# Patient Record
Sex: MALE | Race: WHITE | NOT HISPANIC OR LATINO | URBAN - METROPOLITAN AREA
[De-identification: names, ages, dates, MRNs, and addresses within clinical notes are randomized per-mention and may not be internally consistent; named-entity substitution may affect disease eponyms.]

---

## 2019-04-30 ENCOUNTER — HOSPITAL ENCOUNTER (EMERGENCY)
Facility: HOSPITAL | Age: 29
Discharge: HOME | End: 2019-04-30
Attending: EMERGENCY MEDICINE
Payer: MEDICAID

## 2019-04-30 VITALS
TEMPERATURE: 99 F | WEIGHT: 159.38 LBS | HEIGHT: 70 IN | BODY MASS INDEX: 22.82 KG/M2 | RESPIRATION RATE: 20 BRPM | DIASTOLIC BLOOD PRESSURE: 66 MMHG | HEART RATE: 80 BPM | SYSTOLIC BLOOD PRESSURE: 123 MMHG | OXYGEN SATURATION: 98 %

## 2019-04-30 DIAGNOSIS — S61.412A LACERATION OF LEFT HAND WITHOUT FOREIGN BODY, INITIAL ENCOUNTER: Primary | ICD-10-CM

## 2019-04-30 PROCEDURE — 12001 RPR S/N/AX/GEN/TRNK 2.5CM/<: CPT

## 2019-04-30 PROCEDURE — 25000003 PHARM REV CODE 250: Performed by: REGISTERED NURSE

## 2019-04-30 PROCEDURE — 90471 IMMUNIZATION ADMIN: CPT | Performed by: REGISTERED NURSE

## 2019-04-30 PROCEDURE — 90715 TDAP VACCINE 7 YRS/> IM: CPT | Performed by: REGISTERED NURSE

## 2019-04-30 PROCEDURE — 63600175 PHARM REV CODE 636 W HCPCS: Performed by: REGISTERED NURSE

## 2019-04-30 PROCEDURE — 99284 EMERGENCY DEPT VISIT MOD MDM: CPT | Mod: 25

## 2019-04-30 RX ORDER — LIDOCAINE HYDROCHLORIDE 10 MG/ML
10 INJECTION, SOLUTION EPIDURAL; INFILTRATION; INTRACAUDAL; PERINEURAL
Status: COMPLETED | OUTPATIENT
Start: 2019-04-30 | End: 2019-04-30

## 2019-04-30 RX ORDER — IBUPROFEN 800 MG/1
800 TABLET ORAL
Status: COMPLETED | OUTPATIENT
Start: 2019-04-30 | End: 2019-04-30

## 2019-04-30 RX ADMIN — LIDOCAINE HYDROCHLORIDE 100 MG: 10 INJECTION, SOLUTION EPIDURAL; INFILTRATION; INTRACAUDAL; PERINEURAL at 08:04

## 2019-04-30 RX ADMIN — NEOMYCIN AND POLYMYXIN B SULFATES AND BACITRACIN ZINC: 400; 3.5; 5 OINTMENT TOPICAL at 08:04

## 2019-04-30 RX ADMIN — CLOSTRIDIUM TETANI TOXOID ANTIGEN (FORMALDEHYDE INACTIVATED), CORYNEBACTERIUM DIPHTHERIAE TOXOID ANTIGEN (FORMALDEHYDE INACTIVATED), BORDETELLA PERTUSSIS TOXOID ANTIGEN (GLUTARALDEHYDE INACTIVATED), BORDETELLA PERTUSSIS FILAMENTOUS HEMAGGLUTININ ANTIGEN (FORMALDEHYDE INACTIVATED), BORDETELLA PERTUSSIS PERTACTIN ANTIGEN, AND BORDETELLA PERTUSSIS FIMBRIAE 2/3 ANTIGEN 0.5 ML: 5; 2; 2.5; 5; 3; 5 INJECTION, SUSPENSION INTRAMUSCULAR at 09:04

## 2019-04-30 RX ADMIN — IBUPROFEN 800 MG: 800 TABLET, FILM COATED ORAL at 09:04

## 2019-05-01 NOTE — ED PROVIDER NOTES
SCRIBE #1 NOTE: I, Yessenia Robin, am scribing for, and in the presence of, Yunier Bedolla Jr., TRA. I have scribed the entire note.      History      Chief Complaint   Patient presents with    Laceration     patient reports cut left palm with        Review of patient's allergies indicates:  No Known Allergies     HPI   HPI    4/30/2019, 8:35 PM   History obtained from the patient      History of Present Illness: Rene Barrientos is a 28 y.o. male patient who presents to the Emergency Department for a laceration to L palm which onset just PTA with a . Symptoms are constant and moderate in severity. No mitigating or exacerbating factors reported. No other sxs reported. Patient denies any fever, chills, extremity weakness/numbness, HA, dizziness, n/v, CP, SOB, and all other sxs at this time. No further complaints or concerns at this time.         Arrival mode: Personal vehicle    PCP: No primary care provider on file.   Past Medical History:  Past medical history reviewed not relevant      Past Surgical History:  Past surgical history reviewed not relevant      Family History:  Family history reviewed not relevant      Social History:  Social History    Social History Main Topics    Social History Main Topics    Smoking status: Unknown if ever smoked    Smokeless tobacco: Unknown if ever used    Alcohol Use: Unknown drinking history    Drug Use: Unknown if ever used    Sexual Activity: Unknown         ROS   Review of Systems   Constitutional: Negative for chills and fever.   HENT: Negative for sore throat.    Respiratory: Negative for shortness of breath.    Cardiovascular: Negative for chest pain.   Gastrointestinal: Negative for abdominal pain, nausea and vomiting.   Genitourinary: Negative for dysuria.   Musculoskeletal: Negative for back pain.   Skin: Positive for wound (laceration to L palm). Negative for rash.   Neurological: Negative for dizziness, weakness, numbness and headaches.    Hematological: Does not bruise/bleed easily.   All other systems reviewed and are negative.      Physical Exam      Initial Vitals [19]   BP Pulse Resp Temp SpO2   123/66 80 20 98.9 °F (37.2 °C) 98 %      MAP       --          Physical Exam  Nursing Notes and Vital Signs Reviewed.  Constitutional: Patient is in no acute distress. Well-developed and well-nourished.  Head: Atraumatic. Normocephalic.  Eyes: PERRL. EOM intact. Conjunctivae are not pale. No scleral icterus.  ENT: Mucous membranes are moist. Oropharynx is clear and symmetric.    Neck: Supple. Full ROM. No lymphadenopathy.  Cardiovascular: Regular rate. Regular rhythm. No murmurs, rubs, or gallops. Distal pulses are 2+ and symmetric.  Pulmonary/Chest: No respiratory distress. Clear to auscultation bilaterally. No wheezing or rales.  Abdominal: Soft and non-distended.  There is no tenderness.  No rebound, guarding, or rigidity. Good bowel sounds.  Genitourinary: No CVA tenderness  Musculoskeletal: Moves all extremities. No obvious deformities. No edema. No calf tenderness.  Left Hand: No obvious deformity. 2 cm full thickness laceration to the L medial palmar aspect of L hand. Full flexion and extension of the wrist. Radial, median, and ulnar nerves are intact. Radial and ulnar pulses are 2+. Normal capillary refill.  Distal sensation is intact.  Skin: Warm and dry.  Neurological:  Alert, awake, and appropriate.  Normal speech.  No acute focal neurological deficits are appreciated.  Psychiatric: Normal affect. Good eye contact. Appropriate in content.    ED Course    Lac Repair  Date/Time: 2019 8:54 PM  Performed by: MARANDA Soto Jr.  Authorized by: Vinayak Bolton Jr., MD   Consent Done: Yes  Consent: Verbal consent obtained.  Consent given by: patient  Patient understanding: patient states understanding of the procedure being performed  Patient identity confirmed:  and name  Body area: upper extremity  Location details:  "left hand  Laceration length: 2 cm  Foreign bodies: no foreign bodies  Tendon involvement: none  Nerve involvement: none  Vascular damage: no  Anesthesia: local infiltration    Anesthesia:  Local Anesthetic: lidocaine 1% without epinephrine  Anesthetic total: 4 mL  Patient sedated: no  Preparation: Patient was prepped and draped in the usual sterile fashion.  Irrigation solution: saline  Irrigation method: syringe  Amount of cleaning: extensive  Debridement: none  Degree of undermining: none  Skin closure: Ethilon (4-0)  Number of sutures: 4  Technique: simple  Approximation: close  Approximation difficulty: simple  Dressing: dressing applied and antibiotic ointment  Patient tolerance: Patient tolerated the procedure well with no immediate complications        ED Vital Signs:  Vitals:    04/30/19 2005   BP: 123/66   Pulse: 80   Resp: 20   Temp: 98.9 °F (37.2 °C)   TempSrc: Oral   SpO2: 98%   Weight: 72.3 kg (159 lb 6.3 oz)   Height: 5' 10" (1.778 m)       Abnormal Lab Results:  Labs Reviewed - No data to display     All Lab Results:      Imaging Results:  Imaging Results    None                 The Emergency Provider reviewed the vital signs and test results, which are outlined above.    ED Discussion     9:14 PM: Reassessed pt at this time.  Pt states his condition has improved at this time. Discussed with pt all pertinent ED information and results. Discussed pt dx and plan of tx. Gave pt all f/u and return to the ED instructions. All questions and concerns were addressed at this time. Pt expresses understanding of information and instructions, and is comfortable with plan to discharge. Pt is stable for discharge.      I discussed wound care precautions with patient and/or family/caretaker; specifically that all wounds have risk of infection despite efforts to cleanse and debride the wound; and there is a risk of an occult foreign body (and thus increased risk of infection) despite a negative examination.  I " discussed with patient need to return for any signs of infection, specifically redness, increased pain, fever, drainage of pus, or any concern, immediately.    I discussed with patient and/or family/caretaker that evaluation in the ED does not suggest any emergent or life threatening medical conditions requiring immediate intervention beyond what was provided in the ED, and I believe patient is safe for discharge.  Regardless, an unremarkable evaluation in the ED does not preclude the development or presence of a serious of life threatening condition. As such, patient was instructed to return immediately for any worsening or change in current symptoms.    There are no discharge medications for this patient.      ED Medication(s):  Medications   lidocaine (PF) 10 mg/ml (1%) injection 100 mg (has no administration in time range)   neomycin-bacitracnZn-polymyxnB packet (has no administration in time range)   Tdap vaccine injection 0.5 mL (0.5 mLs Intramuscular Given 4/30/19 2105)   ibuprofen tablet 800 mg (800 mg Oral Given 4/30/19 2104)       Follow-up Information     [facility] -  In 1 week.    Specialty:  Emergency Medicine  Why:  For suture removal  Contact information:  [id number] [facility] [address] [phone]                   Medical Decision Making              Scribe Attestation:   Scribe #1: I performed the above scribed service and the documentation accurately describes the services I performed. I attest to the accuracy of the note.    Attending:   Physician Attestation Statement for Scribe #1: I, Yunier Bedolla Jr., NP, personally performed the services described in this documentation, as scribed by Yessenia Kelly, in my presence, and it is both accurate and complete.          Clinical Impression       ICD-10-CM ICD-9-CM   1. Laceration of left hand without foreign body, initial encounter S61.412A 882.0       Disposition:   Disposition:  Discharged  Condition: Stable         Yunier Bedolla Jr., [facility]  05/01/19 0057